# Patient Record
Sex: OTHER/UNKNOWN
[De-identification: names, ages, dates, MRNs, and addresses within clinical notes are randomized per-mention and may not be internally consistent; named-entity substitution may affect disease eponyms.]

---
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
## 2024-08-26 ENCOUNTER — NON-APPOINTMENT (OUTPATIENT)
Age: 19
End: 2024-08-26

## 2024-08-26 ENCOUNTER — APPOINTMENT (OUTPATIENT)
Dept: FAMILY MEDICINE | Facility: CLINIC | Age: 19
End: 2024-08-26
Payer: COMMERCIAL

## 2024-08-26 VITALS
SYSTOLIC BLOOD PRESSURE: 124 MMHG | BODY MASS INDEX: 30.96 KG/M2 | DIASTOLIC BLOOD PRESSURE: 60 MMHG | WEIGHT: 209 LBS | HEART RATE: 84 BPM | HEIGHT: 69 IN | OXYGEN SATURATION: 98 %

## 2024-08-26 DIAGNOSIS — E66.9 OBESITY, UNSPECIFIED: ICD-10-CM

## 2024-08-26 DIAGNOSIS — H93.93 UNSPECIFIED DISORDER OF EAR, BILATERAL: ICD-10-CM

## 2024-08-26 DIAGNOSIS — K21.9 GASTRO-ESOPHAGEAL REFLUX DISEASE W/OUT ESOPHAGITIS: ICD-10-CM

## 2024-08-26 DIAGNOSIS — Z00.00 ENCOUNTER FOR GENERAL ADULT MEDICAL EXAMINATION W/OUT ABNORMAL FINDINGS: ICD-10-CM

## 2024-08-26 DIAGNOSIS — E34.9 ENDOCRINE DISORDER, UNSPECIFIED: ICD-10-CM

## 2024-08-26 DIAGNOSIS — F64.9 GENDER IDENTITY DISORDER, UNSPECIFIED: ICD-10-CM

## 2024-08-26 DIAGNOSIS — Z78.9 OTHER SPECIFIED HEALTH STATUS: ICD-10-CM

## 2024-08-26 DIAGNOSIS — F95.2 TOURETTE'S DISORDER: ICD-10-CM

## 2024-08-26 DIAGNOSIS — G47.09 OTHER INSOMNIA: ICD-10-CM

## 2024-08-26 PROCEDURE — 36415 COLL VENOUS BLD VENIPUNCTURE: CPT

## 2024-08-26 PROCEDURE — 99385 PREV VISIT NEW AGE 18-39: CPT

## 2024-08-26 RX ORDER — PROGESTERONE 100 MG/1
100 CAPSULE ORAL
Refills: 0 | Status: ACTIVE | COMMUNITY

## 2024-08-26 RX ORDER — SPIRONOLACTONE 50 MG/1
50 TABLET ORAL TWICE DAILY
Refills: 0 | Status: ACTIVE | COMMUNITY

## 2024-08-28 NOTE — HISTORY OF PRESENT ILLNESS
[FreeTextEntry1] : Establish care and PE [de-identified] : Name: [place] She/her Gender: woman Sexuality: aromantic, lesbian, maybe bisexual Wants a PCP.  Hasn't had general checkup since age 16 and requests a PE.  Wants to talk about HRT and get a new prescription. Uses ki, estrogen, T from [facility]. Wants bilateral orchiectomy.  It would mean not needs ki.  And she would not have to worry about having access to ki.  Never cared about fertility.  Doesn't want children. Motorcyclist.  Puts in earplugs.  Uses triple phlanged earplugs.  Sit deep in ear.  Worries about wax in ears.  Likes them for wind noise and loud traffic.   Since childhood, occ gets acid buildup in stomach area.  Sometimes comes out in stool as yellow looking bile.  Sometimes feels like maybe she has an ulcer.  Every few days takes antacid. Usually after she misses a meal she can feel pain in her stomach.  Uses antiacids 3-4 times a week.  works when she takes it. First noted that gender differed from what was assigned at birth was mid to late teens.  Transitioned in the summer of age 16 (summer 2021).  Social transition.  Changed name and pronouns.  Came out to parents.  Dressed more feminine.   Started medication Narcisa 10, 2022.  To be more female appearing.  Good breast development.  Some facial changes in a feminine way.  Maybe more full hips and butt.  Not sure if more emotional.   Wishes that she had less body hair.  Maybe peoples hips. Some insomnia.  Takes diphenhydramine prn. Has Tourette's.  Conscious of it.  Occasionally disruptive. Had seen therapist in the past.   Was told that she might have some low grade depression. Grew up locally in [place]. No concerns for STIs. Weight has been around 200 for a few years now.  Feels out of shape.  Sedentary. Saw pediatrician growing up.  Confident that she had her full pediatric vaccine series.  Got some additional shots a few years ago when applying to college.

## 2024-08-28 NOTE — ASSESSMENT
[FreeTextEntry1] : Annual wellness exam performed. No indication for age/anatomy based cancer screening. Diet/exercise. Get records so I can see immunizations

## 2024-08-28 NOTE — HISTORY OF PRESENT ILLNESS
[FreeTextEntry1] : Establish care and PE [de-identified] : Name: [place] She/her Gender: woman Sexuality: aromantic, lesbian, maybe bisexual Wants a PCP.  Hasn't had general checkup since age 16 and requests a PE.  Wants to talk about HRT and get a new prescription. Uses ki, estrogen, T from [facility]. Wants bilateral orchiectomy.  It would mean not needs ki.  And she would not have to worry about having access to ki.  Never cared about fertility.  Doesn't want children. Motorcyclist.  Puts in earplugs.  Uses triple phlanged earplugs.  Sit deep in ear.  Worries about wax in ears.  Likes them for wind noise and loud traffic.   Since childhood, occ gets acid buildup in stomach area.  Sometimes comes out in stool as yellow looking bile.  Sometimes feels like maybe she has an ulcer.  Every few days takes antacid. Usually after she misses a meal she can feel pain in her stomach.  Uses antiacids 3-4 times a week.  works when she takes it. First noted that gender differed from what was assigned at birth was mid to late teens.  Transitioned in the summer of age 16 (summer 2021).  Social transition.  Changed name and pronouns.  Came out to parents.  Dressed more feminine.   Started medication Narcisa 10, 2022.  To be more female appearing.  Good breast development.  Some facial changes in a feminine way.  Maybe more full hips and butt.  Not sure if more emotional.   Wishes that she had less body hair.  Maybe peoples hips. Some insomnia.  Takes diphenhydramine prn. Has Tourette's.  Conscious of it.  Occasionally disruptive. Had seen therapist in the past.   Was told that she might have some low grade depression. Grew up locally in [place]. No concerns for STIs. Weight has been around 200 for a few years now.  Feels out of shape.  Sedentary. Saw pediatrician growing up.  Confident that she had her full pediatric vaccine series.  Got some additional shots a few years ago when applying to college.

## 2024-08-28 NOTE — PHYSICAL EXAM
[No Acute Distress] : no acute distress [Well Nourished] : well nourished [Well Developed] : well developed [Well-Appearing] : well-appearing [Normal Sclera/Conjunctiva] : normal sclera/conjunctiva [PERRL] : pupils equal round and reactive to light [EOMI] : extraocular movements intact [Normal Outer Ear/Nose] : the outer ears and nose were normal in appearance [Normal Oropharynx] : the oropharynx was normal [No JVD] : no jugular venous distention [No Lymphadenopathy] : no lymphadenopathy [Supple] : supple [Thyroid Normal, No Nodules] : the thyroid was normal and there were no nodules present [No Respiratory Distress] : no respiratory distress  [No Accessory Muscle Use] : no accessory muscle use [Clear to Auscultation] : lungs were clear to auscultation bilaterally [Normal Rate] : normal rate  [Regular Rhythm] : with a regular rhythm [Normal S1, S2] : normal S1 and S2 [No Murmur] : no murmur heard [No Carotid Bruits] : no carotid bruits [No Abdominal Bruit] : a ~M bruit was not heard ~T in the abdomen [No Varicosities] : no varicosities [Pedal Pulses Present] : the pedal pulses are present [No Edema] : there was no peripheral edema [No Palpable Aorta] : no palpable aorta [No Extremity Clubbing/Cyanosis] : no extremity clubbing/cyanosis [Soft] : abdomen soft [Non Tender] : non-tender [Non-distended] : non-distended [No Masses] : no abdominal mass palpated [No HSM] : no HSM [Normal Bowel Sounds] : normal bowel sounds [Normal Posterior Cervical Nodes] : no posterior cervical lymphadenopathy [Normal Anterior Cervical Nodes] : no anterior cervical lymphadenopathy [No CVA Tenderness] : no CVA  tenderness [No Spinal Tenderness] : no spinal tenderness [No Joint Swelling] : no joint swelling [Grossly Normal Strength/Tone] : grossly normal strength/tone [No Rash] : no rash [Coordination Grossly Intact] : coordination grossly intact [No Focal Deficits] : no focal deficits [Normal Gait] : normal gait [Deep Tendon Reflexes (DTR)] : deep tendon reflexes were 2+ and symmetric [Normal Insight/Judgement] : insight and judgment were intact [Normal Affect] : the affect was normal [de-identified] : Both ear canals with some inflammation and some wax along walls

## 2024-08-28 NOTE — PHYSICAL EXAM
[No Acute Distress] : no acute distress [Well Nourished] : well nourished [Well Developed] : well developed [Well-Appearing] : well-appearing [Normal Sclera/Conjunctiva] : normal sclera/conjunctiva [PERRL] : pupils equal round and reactive to light [EOMI] : extraocular movements intact [Normal Outer Ear/Nose] : the outer ears and nose were normal in appearance [Normal Oropharynx] : the oropharynx was normal [No JVD] : no jugular venous distention [No Lymphadenopathy] : no lymphadenopathy [Supple] : supple [Thyroid Normal, No Nodules] : the thyroid was normal and there were no nodules present [No Respiratory Distress] : no respiratory distress  [No Accessory Muscle Use] : no accessory muscle use [Clear to Auscultation] : lungs were clear to auscultation bilaterally [Normal Rate] : normal rate  [Regular Rhythm] : with a regular rhythm [Normal S1, S2] : normal S1 and S2 [No Murmur] : no murmur heard [No Carotid Bruits] : no carotid bruits [No Abdominal Bruit] : a ~M bruit was not heard ~T in the abdomen [No Varicosities] : no varicosities [Pedal Pulses Present] : the pedal pulses are present [No Edema] : there was no peripheral edema [No Palpable Aorta] : no palpable aorta [No Extremity Clubbing/Cyanosis] : no extremity clubbing/cyanosis [Soft] : abdomen soft [Non Tender] : non-tender [Non-distended] : non-distended [No Masses] : no abdominal mass palpated [No HSM] : no HSM [Normal Bowel Sounds] : normal bowel sounds [Normal Posterior Cervical Nodes] : no posterior cervical lymphadenopathy [Normal Anterior Cervical Nodes] : no anterior cervical lymphadenopathy [No CVA Tenderness] : no CVA  tenderness [No Spinal Tenderness] : no spinal tenderness [No Joint Swelling] : no joint swelling [Grossly Normal Strength/Tone] : grossly normal strength/tone [No Rash] : no rash [Coordination Grossly Intact] : coordination grossly intact [No Focal Deficits] : no focal deficits [Normal Gait] : normal gait [Deep Tendon Reflexes (DTR)] : deep tendon reflexes were 2+ and symmetric [Normal Insight/Judgement] : insight and judgment were intact [Normal Affect] : the affect was normal [de-identified] : Both ear canals with some inflammation and some wax along walls

## 2024-08-28 NOTE — HISTORY OF PRESENT ILLNESS
[FreeTextEntry1] : Establish care and PE [de-identified] : Name: [place] She/her Gender: woman Sexuality: aromantic, lesbian, maybe bisexual Wants a PCP.  Hasn't had general checkup since age 16 and requests a PE.  Wants to talk about HRT and get a new prescription. Uses ki, estrogen, T from [facility]. Wants bilateral orchiectomy.  It would mean not needs ki.  And she would not have to worry about having access to ki.  Never cared about fertility.  Doesn't want children. Motorcyclist.  Puts in earplugs.  Uses triple phlanged earplugs.  Sit deep in ear.  Worries about wax in ears.  Likes them for wind noise and loud traffic.   Since childhood, occ gets acid buildup in stomach area.  Sometimes comes out in stool as yellow looking bile.  Sometimes feels like maybe she has an ulcer.  Every few days takes antacid. Usually after she misses a meal she can feel pain in her stomach.  Uses antiacids 3-4 times a week.  works when she takes it. First noted that gender differed from what was assigned at birth was mid to late teens.  Transitioned in the summer of age 16 (summer 2021).  Social transition.  Changed name and pronouns.  Came out to parents.  Dressed more feminine.   Started medication Narcisa 10, 2022.  To be more female appearing.  Good breast development.  Some facial changes in a feminine way.  Maybe more full hips and butt.  Not sure if more emotional.   Wishes that she had less body hair.  Maybe peoples hips. Some insomnia.  Takes diphenhydramine prn. Has Tourette's.  Conscious of it.  Occasionally disruptive. Had seen therapist in the past.   Was told that she might have some low grade depression. Grew up locally in [place]. No concerns for STIs. Weight has been around 200 for a few years now.  Feels out of shape.  Sedentary. Saw pediatrician growing up.  Confident that she had her full pediatric vaccine series.  Got some additional shots a few years ago when applying to college.

## 2024-08-28 NOTE — PHYSICAL EXAM
[No Acute Distress] : no acute distress [Well Nourished] : well nourished [Well Developed] : well developed [Well-Appearing] : well-appearing [Normal Sclera/Conjunctiva] : normal sclera/conjunctiva [PERRL] : pupils equal round and reactive to light [EOMI] : extraocular movements intact [Normal Outer Ear/Nose] : the outer ears and nose were normal in appearance [Normal Oropharynx] : the oropharynx was normal [No JVD] : no jugular venous distention [No Lymphadenopathy] : no lymphadenopathy [Supple] : supple [Thyroid Normal, No Nodules] : the thyroid was normal and there were no nodules present [No Respiratory Distress] : no respiratory distress  [No Accessory Muscle Use] : no accessory muscle use [Clear to Auscultation] : lungs were clear to auscultation bilaterally [Normal Rate] : normal rate  [Regular Rhythm] : with a regular rhythm [Normal S1, S2] : normal S1 and S2 [No Murmur] : no murmur heard [No Carotid Bruits] : no carotid bruits [No Abdominal Bruit] : a ~M bruit was not heard ~T in the abdomen [No Varicosities] : no varicosities [Pedal Pulses Present] : the pedal pulses are present [No Edema] : there was no peripheral edema [No Palpable Aorta] : no palpable aorta [No Extremity Clubbing/Cyanosis] : no extremity clubbing/cyanosis [Soft] : abdomen soft [Non Tender] : non-tender [Non-distended] : non-distended [No Masses] : no abdominal mass palpated [No HSM] : no HSM [Normal Bowel Sounds] : normal bowel sounds [Normal Posterior Cervical Nodes] : no posterior cervical lymphadenopathy [Normal Anterior Cervical Nodes] : no anterior cervical lymphadenopathy [No CVA Tenderness] : no CVA  tenderness [No Spinal Tenderness] : no spinal tenderness [No Joint Swelling] : no joint swelling [Grossly Normal Strength/Tone] : grossly normal strength/tone [No Rash] : no rash [Coordination Grossly Intact] : coordination grossly intact [No Focal Deficits] : no focal deficits [Normal Gait] : normal gait [Deep Tendon Reflexes (DTR)] : deep tendon reflexes were 2+ and symmetric [Normal Insight/Judgement] : insight and judgment were intact [Normal Affect] : the affect was normal [de-identified] : Both ear canals with some inflammation and some wax along walls

## 2024-08-30 LAB
ALBUMIN SERPL ELPH-MCNC: 4.7 G/DL
ALP BLD-CCNC: 73 U/L
ALT SERPL-CCNC: 22 U/L
ANION GAP SERPL CALC-SCNC: 15 MMOL/L
AST SERPL-CCNC: 25 U/L
BASOPHILS # BLD AUTO: 0.06 K/UL
BASOPHILS NFR BLD AUTO: 0.7 %
BILIRUB SERPL-MCNC: 0.3 MG/DL
BUN SERPL-MCNC: 16 MG/DL
CALCIUM SERPL-MCNC: 9.1 MG/DL
CHLORIDE SERPL-SCNC: 105 MMOL/L
CHOLEST SERPL-MCNC: 200 MG/DL
CO2 SERPL-SCNC: 22 MMOL/L
CREAT SERPL-MCNC: 0.79 MG/DL
EGFR: NORMAL ML/MIN/1.73M2
EOSINOPHIL # BLD AUTO: 0.41 K/UL
EOSINOPHIL NFR BLD AUTO: 4.4 %
ESTIMATED AVERAGE GLUCOSE: 100 MG/DL
ESTRADIOL SERPL-MCNC: 110 PG/ML
GLUCOSE SERPL-MCNC: 102 MG/DL
HBA1C MFR BLD HPLC: 5.1 %
HCT VFR BLD CALC: 41.5 %
HDLC SERPL-MCNC: 57 MG/DL
HGB BLD-MCNC: 13.3 G/DL
IMM GRANULOCYTES NFR BLD AUTO: 0.2 %
LDLC SERPL CALC-MCNC: 112 MG/DL
LYMPHOCYTES # BLD AUTO: 1.75 K/UL
LYMPHOCYTES NFR BLD AUTO: 19 %
MAN DIFF?: NORMAL
MCHC RBC-ENTMCNC: 30 PG
MCHC RBC-ENTMCNC: 32 GM/DL
MCV RBC AUTO: 93.5 FL
MONOCYTES # BLD AUTO: 0.74 K/UL
MONOCYTES NFR BLD AUTO: 8 %
NEUTROPHILS # BLD AUTO: 6.24 K/UL
NEUTROPHILS NFR BLD AUTO: 67.7 %
NONHDLC SERPL-MCNC: 143 MG/DL
PLATELET # BLD AUTO: 316 K/UL
POTASSIUM SERPL-SCNC: 4.2 MMOL/L
PROT SERPL-MCNC: 7.7 G/DL
RBC # BLD: 4.44 M/UL
RBC # FLD: 12.6 %
SODIUM SERPL-SCNC: 142 MMOL/L
TESTOST SERPL-MCNC: 13.3 NG/DL
TRIGL SERPL-MCNC: 181 MG/DL
TSH SERPL-ACNC: 1.4 UIU/ML
WBC # FLD AUTO: 9.22 K/UL

## 2024-08-30 RX ORDER — ESTRADIOL 2 MG/1
2 TABLET ORAL
Qty: 360 | Refills: 0 | Status: ACTIVE | COMMUNITY
Start: 1900-01-01 | End: 1900-01-01